# Patient Record
Sex: MALE | Race: WHITE | NOT HISPANIC OR LATINO | URBAN - METROPOLITAN AREA
[De-identification: names, ages, dates, MRNs, and addresses within clinical notes are randomized per-mention and may not be internally consistent; named-entity substitution may affect disease eponyms.]

---

## 2019-10-30 ENCOUNTER — APPOINTMENT (OUTPATIENT)
Age: 69
Setting detail: DERMATOLOGY
End: 2019-11-07

## 2019-10-30 DIAGNOSIS — L20.89 OTHER ATOPIC DERMATITIS: ICD-10-CM

## 2019-10-30 DIAGNOSIS — L82.1 OTHER SEBORRHEIC KERATOSIS: ICD-10-CM

## 2019-10-30 DIAGNOSIS — L85.3 XEROSIS CUTIS: ICD-10-CM

## 2019-10-30 DIAGNOSIS — Z71.89 OTHER SPECIFIED COUNSELING: ICD-10-CM

## 2019-10-30 DIAGNOSIS — D22 MELANOCYTIC NEVI: ICD-10-CM

## 2019-10-30 DIAGNOSIS — L57.8 OTHER SKIN CHANGES DUE TO CHRONIC EXPOSURE TO NONIONIZING RADIATION: ICD-10-CM

## 2019-10-30 DIAGNOSIS — L57.0 ACTINIC KERATOSIS: ICD-10-CM

## 2019-10-30 DIAGNOSIS — L81.4 OTHER MELANIN HYPERPIGMENTATION: ICD-10-CM

## 2019-10-30 PROBLEM — D22.72 MELANOCYTIC NEVI OF LEFT LOWER LIMB, INCLUDING HIP: Status: ACTIVE | Noted: 2019-10-30

## 2019-10-30 PROBLEM — D22.62 MELANOCYTIC NEVI OF LEFT UPPER LIMB, INCLUDING SHOULDER: Status: ACTIVE | Noted: 2019-10-30

## 2019-10-30 PROBLEM — D22.61 MELANOCYTIC NEVI OF RIGHT UPPER LIMB, INCLUDING SHOULDER: Status: ACTIVE | Noted: 2019-10-30

## 2019-10-30 PROBLEM — L20.84 INTRINSIC (ALLERGIC) ECZEMA: Status: ACTIVE | Noted: 2019-10-30

## 2019-10-30 PROBLEM — D22.71 MELANOCYTIC NEVI OF RIGHT LOWER LIMB, INCLUDING HIP: Status: ACTIVE | Noted: 2019-10-30

## 2019-10-30 PROBLEM — D22.5 MELANOCYTIC NEVI OF TRUNK: Status: ACTIVE | Noted: 2019-10-30

## 2019-10-30 PROCEDURE — OTHER TREATMENT REGIMEN: OTHER

## 2019-10-30 PROCEDURE — OTHER LIQUID NITROGEN: OTHER

## 2019-10-30 PROCEDURE — 17000 DESTRUCT PREMALG LESION: CPT

## 2019-10-30 PROCEDURE — OTHER MIPS QUALITY: OTHER

## 2019-10-30 PROCEDURE — 99203 OFFICE O/P NEW LOW 30 MIN: CPT | Mod: 25

## 2019-10-30 PROCEDURE — 17003 DESTRUCT PREMALG LES 2-14: CPT

## 2019-10-30 PROCEDURE — OTHER COUNSELING: OTHER

## 2019-10-30 ASSESSMENT — LOCATION DETAILED DESCRIPTION DERM
LOCATION DETAILED: RIGHT PROXIMAL CALF
LOCATION DETAILED: LEFT DISTAL CALF
LOCATION DETAILED: RIGHT SUPERIOR HELIX
LOCATION DETAILED: LEFT SUPERIOR PARIETAL SCALP
LOCATION DETAILED: LEFT POPLITEAL SKIN
LOCATION DETAILED: LEFT PROXIMAL DORSAL FOREARM
LOCATION DETAILED: RIGHT DISTAL POSTERIOR UPPER ARM
LOCATION DETAILED: LEFT PROXIMAL POSTERIOR THIGH
LOCATION DETAILED: RIGHT DISTAL LATERAL POSTERIOR THIGH
LOCATION DETAILED: LEFT DISTAL POSTERIOR THIGH
LOCATION DETAILED: RIGHT INFERIOR MEDIAL UPPER BACK
LOCATION DETAILED: RIGHT MEDIAL UPPER BACK
LOCATION DETAILED: RIGHT DISTAL POSTERIOR THIGH
LOCATION DETAILED: LEFT DISTAL POSTERIOR UPPER ARM
LOCATION DETAILED: RIGHT POSTERIOR NECK
LOCATION DETAILED: RIGHT SUPERIOR OCCIPITAL SCALP
LOCATION DETAILED: RIGHT SUPERIOR PARIETAL SCALP
LOCATION DETAILED: RIGHT PROXIMAL POSTERIOR UPPER ARM
LOCATION DETAILED: RIGHT PROXIMAL DORSAL FOREARM
LOCATION DETAILED: LEFT CENTRAL MALAR CHEEK
LOCATION DETAILED: RIGHT POPLITEAL SKIN
LOCATION DETAILED: LEFT PROXIMAL POSTERIOR UPPER ARM
LOCATION DETAILED: LEFT SUPERIOR OCCIPITAL SCALP
LOCATION DETAILED: LEFT MEDIAL MALAR CHEEK
LOCATION DETAILED: RIGHT SUPERIOR MEDIAL MIDBACK
LOCATION DETAILED: LEFT CENTRAL PARIETAL SCALP
LOCATION DETAILED: SUPERIOR LUMBAR SPINE
LOCATION DETAILED: RIGHT PROXIMAL POSTERIOR THIGH
LOCATION DETAILED: MID-OCCIPITAL SCALP
LOCATION DETAILED: RIGHT CENTRAL FRONTAL SCALP

## 2019-10-30 ASSESSMENT — LOCATION SIMPLE DESCRIPTION DERM
LOCATION SIMPLE: RIGHT OCCIPITAL SCALP
LOCATION SIMPLE: RIGHT POPLITEAL SKIN
LOCATION SIMPLE: RIGHT SCALP
LOCATION SIMPLE: POSTERIOR NECK
LOCATION SIMPLE: SCALP
LOCATION SIMPLE: RIGHT FOREARM
LOCATION SIMPLE: LEFT CHEEK
LOCATION SIMPLE: LEFT POPLITEAL SKIN
LOCATION SIMPLE: LEFT POSTERIOR THIGH
LOCATION SIMPLE: RIGHT POSTERIOR THIGH
LOCATION SIMPLE: LEFT CALF
LOCATION SIMPLE: RIGHT LOWER BACK
LOCATION SIMPLE: RIGHT UPPER BACK
LOCATION SIMPLE: LOWER BACK
LOCATION SIMPLE: LEFT OCCIPITAL SCALP
LOCATION SIMPLE: POSTERIOR SCALP
LOCATION SIMPLE: RIGHT CALF
LOCATION SIMPLE: LEFT FOREARM
LOCATION SIMPLE: LEFT UPPER ARM
LOCATION SIMPLE: RIGHT UPPER ARM
LOCATION SIMPLE: RIGHT EAR

## 2019-10-30 ASSESSMENT — LOCATION ZONE DERM
LOCATION ZONE: LEG
LOCATION ZONE: TRUNK
LOCATION ZONE: EAR
LOCATION ZONE: SCALP
LOCATION ZONE: FACE
LOCATION ZONE: NECK
LOCATION ZONE: ARM

## 2019-10-30 NOTE — PROCEDURE: TREATMENT REGIMEN
Detail Level: Zone
Samples Given: Cordran cream
Otc Regimen: Cetaphil, CeraVe or Vanicream moistures daily

## 2019-10-30 NOTE — PROCEDURE: LIQUID NITROGEN
Post-Care Instructions: I reviewed with the patient in detail post-care instructions. Patient is to wear sunprotection, and avoid picking at any of the treated lesions. Pt may apply Vaseline to crusted or scabbing areas.
Consent: The patient's consent was obtained including but not limited to risks of crusting, scabbing, blistering, scarring, darker or lighter pigmentary change, recurrence, incomplete removal and infection.
Render Post-Care Instructions In Note?: yes
Duration Of Freeze Thaw-Cycle (Seconds): 2
Detail Level: Simple
Render Note In Bullet Format When Appropriate: No
Number Of Freeze-Thaw Cycles: 2 freeze-thaw cycles

## 2021-03-29 ENCOUNTER — NEW PATIENT (OUTPATIENT)
Dept: URBAN - METROPOLITAN AREA CLINIC 44 | Facility: CLINIC | Age: 71
End: 2021-03-29
Payer: MEDICARE

## 2021-03-29 DIAGNOSIS — H43.393 OTHER VITREOUS OPACITIES, BILATERAL: ICD-10-CM

## 2021-03-29 DIAGNOSIS — H25.813 COMBINED FORMS OF AGE-RELATED CATARACT, BILATERAL: ICD-10-CM

## 2021-03-29 DIAGNOSIS — Z79.84 LONG TERM (CURRENT) USE OF ORAL ANTIDIABETIC DRUGS: ICD-10-CM

## 2021-03-29 DIAGNOSIS — E11.9 TYPE 2 DIABETES MELLITUS WITHOUT COMPLICATIONS: Primary | ICD-10-CM

## 2021-03-29 PROCEDURE — 92004 COMPRE OPH EXAM NEW PT 1/>: CPT | Performed by: OPTOMETRIST

## 2021-03-29 PROCEDURE — 92134 CPTRZ OPH DX IMG PST SGM RTA: CPT | Performed by: OPTOMETRIST

## 2021-03-29 ASSESSMENT — KERATOMETRY
OS: 44.25
OD: 44.25

## 2021-03-29 ASSESSMENT — INTRAOCULAR PRESSURE
OD: 12
OS: 14

## 2021-03-29 ASSESSMENT — VISUAL ACUITY
OD: 20/40
OS: 20/25

## 2022-11-07 NOTE — PROCEDURE: COUNSELING
Occupational Therapy Visit    Visit Type: Discharge Summary  Visit: 5  Referring Provider: Edmundo Remy MD  Medical Diagnosis (from order): [unfilled]     SUBJECTIVE                                                                                                               The patient reports that he has been able to complete most daily tasks with use of the left arm, all below shoulder level. He feels as though he is at a comfortable place with pain control techniques, exercises, etc., that he plans to continue independently at this time. He is anticipated to undergo rotator cuff repair next year after he assists with his wife post-operative care. The patient has no additional questions or concerns regarding his shoulder. He does feel as though the kinesiotape has been beneficial in giving him support and relief.  Functional Change: Able to complete all ADLs and IADLs below shoulder height without significant discomfort or concern.  Current Functional Limitations: Pain, decreased range of motion and decreased strength, limiting ADLs and IADLS above shoulder height, such as reaching into a high cabinet or closet, working overhead at work, etc.     Pain / Symptoms  - Pain rating (out of 10): Current: 0      OBJECTIVE                                                                                                                                       Treatment    Ultrasound (24586)  - Location: GH joint  - Position: sitting  - Duty Cycle: 50%  - Frequency: 3 Mhz  - Intensity (w/cm2): 1.5  - Duration: 8 minutes    Results: decreased pain  Reaction: no adverse reaction to treatment      Therapeutic Exercise  Supine Shoulder Flexion AAROM with Hands Clasped - 3-5 x daily - 7 x weekly - 1 sets - 5-6 reps - 10-15s hold  Standing Single Arm Shoulder Flexion Towel Slide at Table Top - 3-5 x daily - 7 x weekly - 1 sets - 5-6 reps - 10-15s hold  -completed with therapy ball on this date for scapular protraction/retraction 
and cross over stretch*  Standing Backward Shoulder Rolls - 3-5 x daily - 7 x weekly - 1 sets - 15 reps  Seated Scapular Retraction - 3-5 x daily - 7 x weekly - 1 sets - 15 repetitions      Standing Isometric Shoulder Extension with Doorway - Arm Bent - 2-3 x daily - 7 x weekly - 1 sets - 10 reps - 5-6s hold  Standing Isometric Shoulder External Rotation with Doorway and Towel Roll - 2-3 x daily - 7 x weekly - 1 sets - 10 reps - 5-6s hold  Standing Isometric Shoulder Flexion with Doorway - Arm Bent - 2-3 x daily - 7 x weekly - 1 sets - 10 reps - 5-6s hold  Bent over rows and shoulder extension -no weight x 10*    Manual Therapy   Supine PROM into shoulder flexion, external rotation  Side lying scapular mobs  Kinesiotaping for lateral and anterior upper arm support  -one wishbone style K-tape anchored just distal to deltoid tuberosity, extending proximally to both anterior and posterior aspect of the shoulder with ~50% stretch  -additional \"I\" strip anchored at the mid-aspect of the anterior upper arm, extending proximally and anchored at the acromion with ~50% stretch  -patient provided additional strips for self-application at home; patient instructed on method and techniques for placing kinesiotape independently    Skilled input: verbal instruction/cues    Writer verbally educated and received verbal consent for hand placement, positioning of patient, and techniques to be performed today from patient for clothing adjustments for techniques, hand placement and palpation for techniques, therapist position for techniques and modality application as described above and how they are pertinent to the patient's plan of care.    Home Exercise Program  *above indicates provided as part of home exercise program  Complete the above listed exercises as instructed  Utilize heat or ice as needed for discomfort  Avoid painful motions or activities  Access Code: [email or web address]  URL: [email or web address]  Date: 
11/02/2022  Prepared by: Man Lopez    Exercises  · Prone Shoulder Extension - Single Arm with Dumbbell - 1 x daily - 7 x weekly - 1 sets - 10 reps  · Prone Shoulder Row - 1 x daily - 7 x weekly - 1 sets - 10 reps               ASSESSMENT                                                                                                          To date the patient has made gains as expected. not as expected due to Traumatic rotator cuff tears; patient a surgical candidate and will be undergoing repair in 2023.    The patient has experience notable improvement overall in his shoulder pain and function. He has a home exercise program that has been tolerable for him that allows him to maintain full ROM of his shoulder. He has been educated pain management techniques, use of kinesiotape for shoulder support and pain relief. He has maximized his function at this point and will be undergoing rotator cuff repair in 2023. Will discharge from OT at this time.  Pain/symptoms after session (out of 10): 1  Education:   - Results of above outlined education: Verbalizes understanding and Demonstrates understanding    PLAN                                                                                                                           Discharge from skilled therapy with instructions/recommendations to: continue home exercise program, follow up with referring provider    Suggestions for next session as indicated: Discharge from OT services      Goals  Decrease pain/symptoms to 0/10.- progressing towards  The above improvements in impairments to assist in obtaining goals listed below  Long Term Goals: to be met by end of plan of care   1. Patient will complete modified independent upper body dressing and modified independent lower body dressing with reported manageable/tolerable pain.Status: met   2. Patient will sleep 6 hours without disruption from pain/difficulty with positional changes. Status: met   3. Quick DASH: 
Patient will complete form to reflect an improved calculated score to less than or equal to 30 to indicate patient reported improvement in function/disability/impairment. (minimal clinically important difference = 15.91) Status: progressing/ongoing  4. Patient will be independent with progressed and modified home exercise program.Status: met       Therapy procedure time and total treatment time can be found documented on the Time Entry flowsheet  
Detail Level: Zone
Detail Level: Simple
Detail Level: Generalized